# Patient Record
(demographics unavailable — no encounter records)

---

## 2025-03-05 NOTE — HISTORY OF PRESENT ILLNESS
[FreeTextEntry1] : establish care  bloood in urine  [de-identified] : 61 yo f presents to establish care   recently noted blood in urine  remarks last year had microscopic hematuria  3 weeks ago noticed blood in urine, no pain, no burning, no odor

## 2025-03-05 NOTE — PLAN
[FreeTextEntry1] : follow up labs  reviewed ct score with patient  sending to cardio   blood in urine, notable  sending to uro   hx of thyroid enlarged and nodules  reviewed US with patient  will send for repeat   platuued on weight loss med  will decreased to 1 mg and see if any changes

## 2025-03-05 NOTE — HEALTH RISK ASSESSMENT
[Good] : ~his/her~  mood as  good [No] : In the past 12 months have you used drugs other than those required for medical reasons? No [0] : 2) Feeling down, depressed, or hopeless: Not at all (0) [PHQ-2 Negative - No further assessment needed] : PHQ-2 Negative - No further assessment needed [Audit-CScore] : 0 [de-identified] : limited, walking and pilates  [de-identified] : fruits, veggies  [[id number]] : 0 [Patient reported mammogram was normal] : Patient reported mammogram was normal [Patient reported PAP Smear was normal] : Patient reported PAP Smear was normal [Patient reported colonoscopy was normal] : Patient reported colonoscopy was normal [HIV test declined] : HIV test declined [Hepatitis C test declined] : Hepatitis C test declined [Change in mental status noted] : No change in mental status noted [Language] : denies difficulty with language [With Significant Other] : lives with significant other [Employed] : employed [] :  [Feels Safe at Home] : Feels safe at home [Fully functional (bathing, dressing, toileting, transferring, walking, feeding)] : Fully functional (bathing, dressing, toileting, transferring, walking, feeding) [Fully functional (using the telephone, shopping, preparing meals, housekeeping, doing laundry, using] : Fully functional and needs no help or supervision to perform IADLs (using the telephone, shopping, preparing meals, housekeeping, doing laundry, using transportation, managing medications and managing finances) [Reports changes in hearing] : Reports no changes in hearing [Reports changes in vision] : Reports no changes in vision [MammogramDate] : 05/24 [ColonoscopyDate] : 01/20 [FreeTextEntry2] :  in [facility] ED  [Never] : Never

## 2025-04-14 NOTE — ASSESSMENT
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  #Afib s/p ablation  EKG today - NSR with PACs - continue Toprol 100mg po qd  - continue Xarelto 20vmg qd - follows with EP  #CAD  calcium score 11 in 2018 asymptomatic with good exercise tolerance  BP well controlled  DM - controlled with diet and ozempic lipids well controlled  - continue atorvastatin 20mg po qd  #dilated RA/RV  TTE 2019 - EF 55%, RA dilated, RV dilated, mild mr, mild TR, mild PI, PASP 29   - repeat TTE to evaluate RV size and function   #DM  #obesity - discussed possibility of gaining weight and return of DM after stopping ozempic - she will monitor weight and will try diet and exercise modification  #hematuria  - has urology appt  #thyroid nodule - has endocrinology follow-up  RTC in 3 months   Patient seen and plan discussed with Dr. Darryl Kapoor MD  Attending Attestation:  I performed a history and physical examination of the patient and discussed the management with the patient and the Cardiology fellow. The Cardiology fellow, noted herewith, was here to observe and/or participate in the visit & follow plan of care established by me. I reviewed the fellow's note, laboratory results, and diagnostic reports, and agree with the documented findings and plan of care.

## 2025-04-14 NOTE — ASSESSMENT
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  #Afib s/p ablation  EKG today - NSR with PACs - continue Toprol 100mg po qd  - continue Xarelto 20vmg qd - follows with EP  #CAD  calcium score 11 in 2018 asymptomatic with good exercise tolerance  BP well controlled  DM - controlled with diet and ozempic lipids well controlled  - continue atorvastatin 20mg po qd  #dilated RA/RV  TTE 2019 - EF 55%, RA dilated, RV dilated, mild mr, mild TR, mild PI, PASP 29   - repeat TTE to evaluate RV size and function   #DM  #obesity - discussed possibility of gaining weight and return of DM after stopping ozempic - she will monitor weight and will try diet and exercise modification  #hematuria  - has urology appt  #thyroid nodule - has endocrinology follow-up  RTC in 3 months   Patient seen and plan discussed with Dr. Darryl Kapoor MD  Attending Attestation:  I performed a history and physical examination of the patient and discussed the management with the patient and the Cardiology fellow. The Cardiology fellow, noted herewith, was here to observe and/or participate in the visit & follow plan of care established by me. I reviewed the fellow's note, laboratory results, and diagnostic reports, and agree with the documented findings and plan of care.

## 2025-04-14 NOTE — PHYSICAL EXAM
[Well Developed] : well developed [Normal Conjunctiva] : normal conjunctiva [Normal Venous Pressure] : normal venous pressure [Normal S1, S2] : normal S1, S2 [Clear Lung Fields] : clear lung fields [Normal Gait] : normal gait [No Edema] : no edema [Moves all extremities] : moves all extremities [Alert and Oriented] : alert and oriented

## 2025-04-14 NOTE — REASON FOR VISIT
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 Feels well. Denies chest pain, sob, geller, orthopnea, le edema. 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 Able to walk up 2 - 3 flights of stairs before becoming winded.  Previously had a calcium score in 2018 which was 11. She has been on atorvastatin with most recent LDL 74.  She follows with Dr. Gant, but would like to establish with a general cardiologist. She was recently found to have blood in her urine and is worried about the Xarelto. Discussed that Xarelto itself should not cause hematuria, so further workup should be done. She has an appt with urologist abhijit.   She was diagnosed with DM years ago. At that time she was 275 lbs and on metformin. Started ozempic 6 years ago and was on the 2.4 mg sub q dose. Discussed with her PCP that she doesn't want to be on ozempic forever, so it was stopped 3 weeks ago. She has made changes to her diet - no red meat, eats chicken/fish/vegetables and is transitioning to a vegetarian diet. She will try exercising more to avoid weight gain - prefers pilates.  She has 3 college-aged children. . No complications, pre-eclampsia or gestational dm.   FH Mother MI at 82, HTN; Father HTN, DM; sibilings HTN, thyroid disease   Social hx  works as nurse in ER. No alcohol/smoking/ drug use. 
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 Feels well. Denies chest pain, sob, geller, orthopnea, le edema. 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 Able to walk up 2 - 3 flights of stairs before becoming winded.  Previously had a calcium score in 2018 which was 11. She has been on atorvastatin with most recent LDL 74.  She follows with Dr. Gant, but would like to establish with a general cardiologist. She was recently found to have blood in her urine and is worried about the Xarelto. Discussed that Xarelto itself should not cause hematuria, so further workup should be done. She has an appt with urologist abhijit.   She was diagnosed with DM years ago. At that time she was 275 lbs and on metformin. Started ozempic 6 years ago and was on the 2.4 mg sub q dose. Discussed with her PCP that she doesn't want to be on ozempic forever, so it was stopped 3 weeks ago. She has made changes to her diet - no red meat, eats chicken/fish/vegetables and is transitioning to a vegetarian diet. She will try exercising more to avoid weight gain - prefers pilates.  She has 3 college-aged children. . No complications, pre-eclampsia or gestational dm.   FH Mother MI at 82, HTN; Father HTN, DM; sibilings HTN, thyroid disease   Social hx  works as nurse in ER. No alcohol/smoking/ drug use. 
L&D Pacu

## 2025-07-03 NOTE — HISTORY OF PRESENT ILLNESS
[FreeTextEntry1] : She previously saw Dr Decker, and then Dr Montilla (who moved to [place]). She was followed for thyroid nodules and weight management/obesity. Daughter and mother have thyroid disease.  no history of RT to neck. Never had FNA. labs reviewed from 3/25:  TSH 0.90 thyroid sono report reviewed, 3/25:  multiple bilateral thyroid nodules, largest being 1.6cm on R side.  None showing suspicious features.  She has been on Ozempic 2mg/week for years and weight decreased to 188 lb but weight has been increasing in the past year. She feels that Ozempic is not suppressing hunger like before.  She is having some food cravings.  She was rx'd bupropion and naltrexone in the past and felt that these helped reduce food cravings. She walks 5-8 miles, 3x/week.  The other days, she works 12 hours shift, so she is on her feet.  PMH:   obesity.  Initial weight 276 lb (BMI 44.1) thyroid nodules Afib/flutter  Meds atorvastatin 20mg Xarelto metoprolol ER 100mg zolpidem prn (3x/month)

## 2025-07-03 NOTE — PHYSICAL EXAM
[Alert] : alert [No Acute Distress] : no acute distress [No Proptosis] : no proptosis [No Lid Lag] : no lid lag [Normal Hearing] : hearing was normal [No LAD] : no lymphadenopathy [Thyroid Not Enlarged] : the thyroid was not enlarged [Clear to Auscultation] : lungs were clear to auscultation bilaterally [Normal S1, S2] : normal S1 and S2 [Regular Rhythm] : with a regular rhythm [Normal Affect] : the affect was normal [Normal Mood] : the mood was normal [de-identified] : nodules not palpable [de-identified] : mild acanthosis nigricans

## 2025-07-03 NOTE — ASSESSMENT
[FreeTextEntry1] : Explained that the overwhelming majority of thyroid nodules (over 90%) are benign and most thyroid nodules tend to grow slowly over time (1-2mm/year).   Refer pt for FNA of 1.6cm R nodule (meets FNA criteria by size).    If FNA is negative, will continue monitoring sono periodically;  check TSH yearly.  BMI 32.  Initial BMI 44.1 (initial weight 265 lb).   24.5% total weight loss (max weight loss was at 29%) Continue semaglutide 2mg/week. continue regular exercise; I prefer she do 5x/week, even if that means walking a shorter distance. Add bupropion 100mg bid and naltrexone 25mg/day to reduce food cravings and emotional eating. Contrave samples given (#14). RTO 6 months

## 2025-07-03 NOTE — DATA REVIEWED
[FreeTextEntry1] : 3/25  TSH 0.90, trig 85, HDL 64, LDL 74, A1c 5.3  thyroid sono, 3/25:  R lower nodule 16 x 11 x 15mm, no suspicious features  L upper nodule 12 x 9 x 10mm, no suspicious features  other sub-cm nodules: R upper (4mm), R mid (5mm), L lower (4mm), R isthmus (4mm), mid isthmus (6mm)